# Patient Record
Sex: FEMALE | Race: WHITE | HISPANIC OR LATINO
[De-identification: names, ages, dates, MRNs, and addresses within clinical notes are randomized per-mention and may not be internally consistent; named-entity substitution may affect disease eponyms.]

---

## 2020-09-30 PROBLEM — Z00.00 ENCOUNTER FOR PREVENTIVE HEALTH EXAMINATION: Status: ACTIVE | Noted: 2020-09-30

## 2020-10-30 ENCOUNTER — APPOINTMENT (OUTPATIENT)
Dept: ENDOCRINOLOGY | Facility: CLINIC | Age: 60
End: 2020-10-30
Payer: MEDICAID

## 2020-10-30 VITALS
HEIGHT: 59 IN | OXYGEN SATURATION: 98 % | BODY MASS INDEX: 39.11 KG/M2 | HEART RATE: 98 BPM | DIASTOLIC BLOOD PRESSURE: 78 MMHG | WEIGHT: 194 LBS | SYSTOLIC BLOOD PRESSURE: 118 MMHG

## 2020-10-30 DIAGNOSIS — Z83.3 FAMILY HISTORY OF DIABETES MELLITUS: ICD-10-CM

## 2020-10-30 DIAGNOSIS — Z78.9 OTHER SPECIFIED HEALTH STATUS: ICD-10-CM

## 2020-10-30 LAB
GLUCOSE BLDC GLUCOMTR-MCNC: 125
HBA1C MFR BLD HPLC: 11.9
LDLC SERPL DIRECT ASSAY-MCNC: 143

## 2020-10-30 PROCEDURE — 99072 ADDL SUPL MATRL&STAF TM PHE: CPT

## 2020-10-30 PROCEDURE — 99205 OFFICE O/P NEW HI 60 MIN: CPT | Mod: 25

## 2020-10-30 PROCEDURE — 82962 GLUCOSE BLOOD TEST: CPT

## 2020-10-30 RX ORDER — OMEPRAZOLE 40 MG/1
40 CAPSULE, DELAYED RELEASE ORAL
Refills: 0 | Status: ACTIVE | COMMUNITY

## 2020-10-30 NOTE — PHYSICAL EXAM
[Alert] : alert [Healthy Appearance] : healthy appearance [Obese] : obese [No Acute Distress] : no acute distress [No LAD] : no lymphadenopathy [No Thyroid Nodules] : no palpable thyroid nodules [No Accessory Muscle Use] : no accessory muscle use [Clear to Auscultation] : lungs were clear to auscultation bilaterally [Normal S1, S2] : normal S1 and S2 [Normal Rate] : heart rate was normal [No Edema] : no peripheral edema [Normal Bowel Sounds] : normal bowel sounds [Not Tender] : non-tender [Soft] : abdomen soft [Normal Gait] : normal gait [Acanthosis Nigricans] : no acanthosis nigricans [Foot Ulcers] : no foot ulcers [Right Foot Was Examined] : right foot ~C was examined [Left Foot Was Examined] : left foot ~C was examined [1+] : 1+ in the dorsalis pedis [Vibration Dec.] : normal vibratory sensation at the level of the toes [Position Sense Dec.] : normal position sense at the level of the toes [Diminished Throughout Both Feet] : normal tactile sensation with monofilament testing throughout both feet [Cranial Nerves Intact] : cranial nerves 2-12 were intact [Oriented x3] : oriented to person, place, and time [Normal Affect] : the affect was normal [de-identified] : (+) Cushingoid appearance - buffalo hump, thin extremities, supraclavicaly fat pads, no abd striae, obese abdomen [de-identified] : flighty thoughts, needs frequent redirection

## 2020-10-30 NOTE — REVIEW OF SYSTEMS
[Abdominal Pain] : abdominal pain [Polyuria] : polyuria [Nocturia] : nocturia [Pain/Numbness of Digits] : pain/numbness of digits [Polydipsia] : polydipsia [Fatigue] : no fatigue [Recent Weight Gain (___ Lbs)] : recent weight gain: [unfilled] lbs [Blurred Vision] : no blurred vision [Chest Pain] : no chest pain [Shortness Of Breath] : no shortness of breath [Nausea] : no nausea [Vomiting] : no vomiting [Diarrhea] : no diarrhea [Depression] : no depression [Anxiety] : no anxiety [Cold Intolerance] : no cold intolerance [Heat Intolerance] : no heat intolerance [FreeTextEntry2] : (+) increased appetite

## 2020-10-30 NOTE — DATA REVIEWED
[FreeTextEntry1] : Labs 10/22/20\par Gluc 249, A1c 11.9\par Cr 0.80, GFR 80\par , HDL 54, Tg 189\par TSH 0.945, T4 7.8, T3 107\par B12 741, vit D 39.2\par

## 2020-10-30 NOTE — ASSESSMENT
[Long Term Vascular Complications] : long term vascular complications of diabetes [Carbohydrate Consistent Diet] : carbohydrate consistent diet [Importance of Diet and Exercise] : importance of diet and exercise to improve glycemic control, achieve weight loss and improve cardiovascular health [Self Monitoring of Blood Glucose] : self monitoring of blood glucose [Retinopathy Screening] : Patient was referred to ophthalmology for retinopathy screening [FreeTextEntry1] : 60 year old female\par 1. 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 in setting of obesity - uncontrolled due to insufficient insulin doses and chronically poor diet (fast food, sugary drinks, high carb, constantly eating)\par - Levemir 70 units BID\par - Ademelog premeals only 100-150 40 units, 151-200 45, 201-250 50, 251-300 55, 301-350 60, 351+ 65 units\par cont SMBG\par - counseled pt on dietary changes but she refused to change eating habits\par - will likely need Humulin U500 insulin given that she take >150 units insulin per day and still with poor control, will send Rx to Pharmacy and work on PA if needed, RTO 2 weeks with CDE to discuss/learn how to use this insulin\par - will need A1c ,<8% prior to bariatric surgery\par \par 2. 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 Normal TSH level\par - check 24 hours urine cortisol to exclude hypercortisolism\par - considering bariatric surgery \par \par 3. HLD - cannot take statin (by history)\par  - counseled pt on low fat diet, again pt refuses to change diet\par \par pt returned to office stating that  and she is not sure what to do bc she is going to eat.  Advised pt to take Admelog 10-15 min before meals and use the scale that we just discussed\par \par \par

## 2020-10-30 NOTE — CONSULT LETTER
[Dear  ___] : Dear  [unfilled], [Consult Letter:] : I had the pleasure of evaluating your patient, [unfilled]. [Please see my note below.] : Please see my note below. [Consult Closing:] : Thank you very much for allowing me to participate in the care of this patient.  If you have any questions, please do not hesitate to contact me. [Sincerely,] : Sincerely, [FreeTextEntry3] : Yeny Pepe, \par

## 2020-10-30 NOTE — HISTORY OF PRESENT ILLNESS
[FreeTextEntry1] : poor and confusing historian, planning on bariatric surgery 1/2020 - gastric bypass. \par \par Quality: Type 2\par Severity: severe, uncontrolled\par Duration: 2003\par Onset: brother with history DM, brother tested FS and it was 200's\par Had been following with another endocrinologist at [place] but she felt he did nothing for her.  Nonadherent with medical follow up in past.\par \par ASSOCIATED SYMPTOMS/COMPLICATIONS:\par Eye exam no DR per pt 2019\par (+) Neuropathy\par (+) HUNTER\par \par MODIFYING FACTORS: initially started oral medications and later switched to insulin. she has been has been on insulin for about 15 years, she reports chronic poor control. \par Lifestyle: diet - eats whatever she wants and drinks sugary drinks.  exercise - none. weight gain summer w insulin\par Medication history: Lantus does not work. Trulicity - self DC due to weight gain. Novolog not covered by insurance.\par Current DM meds:  Levemir 60-80units BID. Admelog premeals. takes insulin without meals\par Admelog >100 30, >150 32, >200 34, >250 36, >300 38. 350 >42, 400 45 units\par Admelog scale without meals >400 60 units, takes this overnight\par \par SMBG: testing 4x daily, -high on meter. reports FS 40 at times for which she treats with multiple candy bars.\par

## 2020-11-06 ENCOUNTER — APPOINTMENT (OUTPATIENT)
Dept: ENDOCRINOLOGY | Facility: CLINIC | Age: 60
End: 2020-11-06
Payer: MEDICAID

## 2020-11-06 PROCEDURE — 99072 ADDL SUPL MATRL&STAF TM PHE: CPT

## 2020-11-06 PROCEDURE — G0108 DIAB MANAGE TRN  PER INDIV: CPT

## 2020-11-09 RX ORDER — INSULIN HUMAN 500 [IU]/ML
500 INJECTION, SOLUTION SUBCUTANEOUS
Qty: 2 | Refills: 1 | Status: DISCONTINUED | COMMUNITY
Start: 2020-10-30 | End: 2020-11-09

## 2020-11-10 ENCOUNTER — NON-APPOINTMENT (OUTPATIENT)
Age: 60
End: 2020-11-10

## 2020-11-11 RX ORDER — INSULIN HUMAN 500 [IU]/ML
500 INJECTION, SOLUTION SUBCUTANEOUS
Qty: 135 | Refills: 1 | Status: DISCONTINUED | COMMUNITY
Start: 2020-11-09 | End: 2020-11-11

## 2020-11-12 ENCOUNTER — NON-APPOINTMENT (OUTPATIENT)
Age: 60
End: 2020-11-12

## 2020-11-12 DIAGNOSIS — K75.81 NONALCOHOLIC STEATOHEPATITIS (NASH): ICD-10-CM

## 2020-11-12 DIAGNOSIS — R68.89 OTHER GENERAL SYMPTOMS AND SIGNS: ICD-10-CM

## 2020-11-19 ENCOUNTER — NON-APPOINTMENT (OUTPATIENT)
Age: 60
End: 2020-11-19

## 2020-11-25 ENCOUNTER — NON-APPOINTMENT (OUTPATIENT)
Age: 60
End: 2020-11-25

## 2020-12-02 LAB
HBA1C MFR BLD HPLC: 12.3
LDLC SERPL DIRECT ASSAY-MCNC: 129

## 2020-12-03 ENCOUNTER — RESULT CHARGE (OUTPATIENT)
Age: 60
End: 2020-12-03

## 2020-12-03 ENCOUNTER — APPOINTMENT (OUTPATIENT)
Dept: ENDOCRINOLOGY | Facility: CLINIC | Age: 60
End: 2020-12-03
Payer: MEDICAID

## 2020-12-03 VITALS — WEIGHT: 196 LBS | BODY MASS INDEX: 39.51 KG/M2 | HEART RATE: 102 BPM | OXYGEN SATURATION: 98 % | HEIGHT: 59 IN

## 2020-12-03 VITALS — DIASTOLIC BLOOD PRESSURE: 80 MMHG | SYSTOLIC BLOOD PRESSURE: 120 MMHG

## 2020-12-03 PROCEDURE — 99215 OFFICE O/P EST HI 40 MIN: CPT | Mod: 25

## 2020-12-03 PROCEDURE — 99072 ADDL SUPL MATRL&STAF TM PHE: CPT

## 2020-12-03 PROCEDURE — 82962 GLUCOSE BLOOD TEST: CPT

## 2020-12-03 RX ORDER — SYRINGE,INSUL U-500,NDL,0.5ML 31GX15/64"
31G X 6MM SYRINGE, EMPTY DISPOSABLE MISCELLANEOUS
Qty: 3 | Refills: 0 | Status: DISCONTINUED | COMMUNITY
Start: 2020-11-09 | End: 2020-12-03

## 2020-12-03 RX ORDER — DEXAMETHASONE 1 MG/1
1 TABLET ORAL
Qty: 1 | Refills: 0 | Status: DISCONTINUED | COMMUNITY
Start: 2020-11-12 | End: 2020-12-03

## 2020-12-04 LAB — GLUCOSE BLDC GLUCOMTR-MCNC: 244

## 2020-12-04 NOTE — DATA REVIEWED
[FreeTextEntry1] : Labs 10/22/20\par Gluc 249, A1c 11.9\par Cr 0.80, GFR 80\par , HDL 54, Tg 189\par TSH 0.945, T4 7.8, T3 107\par B12 741, vit D 39.2\par \par Labs 11/4/20\par 24 hour urine cortisol 22.9 (normal)\par A1c 12.4\par Cr 0.71\par \par TSH 1.28\par \par Labs 11/16/20 1 mg DST cortisol 1.0 (appropriate)\par

## 2020-12-04 NOTE — ASSESSMENT
[FreeTextEntry1] : 60 year old female\par 1. 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 Normal TSH level, Rules out for Cushings w normal 24 hour urine cortisol and normal 1 mg DST\par - second opinion w different bariatric surgeon\par \par 3. HLD - cannot take statin (by history)\par  - counseled pt on low fat diet, again pt refuses to change diet\par \par \par f/u 1 month\par \par \par \par

## 2020-12-04 NOTE — HISTORY OF PRESENT ILLNESS
[FreeTextEntry1] : poor and confusing historian, planning on bariatric surgery 1/2020 - gastric bypass vs gastric sleeve, but needs lower A1c\par stared Humulin U500 2-3 days ago but does not feels that it is working - taking 50-60 units TID\par she fired her bariatric surgeons and will see new surgeon, Dr Daniel. \par \par Quality: Type 2\par Severity: severe, uncontrolled\par Duration: 2003\par Onset: brother with history DM, brother tested FS and it was 200's\par Had been following with another endocrinologist at [place] but she felt he did nothing for her.  Nonadherent with medical follow up in past.\par \par ASSOCIATED SYMPTOMS/COMPLICATIONS:\par Eye exam no DR per pt 2019\par (+) Neuropathy\par (+) HUNTER\par \par MODIFYING FACTORS: initially started oral medications and later switched to insulin. she has been has been on insulin for about 15 years, she reports chronic poor control. \par Lifestyle: diet - eats whatever she wants and drinks sugary drinks.  exercise - none. weight gain summer w insulin\par Medication history: Lantus does not work. Trulicity - self DC due to weight gain. Novolog not covered by insurance.\par Current DM meds:  Humalog U500 60 units TID\par \par SMBG: testing 4x daily Fs 300-500 w U500 insulin\par

## 2020-12-04 NOTE — PHYSICAL EXAM
[Alert] : alert [Healthy Appearance] : healthy appearance [Obese] : obese [No Acute Distress] : no acute distress [No LAD] : no lymphadenopathy [No Thyroid Nodules] : no palpable thyroid nodules [No Accessory Muscle Use] : no accessory muscle use [Clear to Auscultation] : lungs were clear to auscultation bilaterally [Normal S1, S2] : normal S1 and S2 [Normal Rate] : heart rate was normal [No Edema] : no peripheral edema [Normal Bowel Sounds] : normal bowel sounds [Not Tender] : non-tender [Soft] : abdomen soft [Normal Gait] : normal gait [Cranial Nerves Intact] : cranial nerves 2-12 were intact [Oriented x3] : oriented to person, place, and time [Normal Affect] : the affect was normal [Acanthosis Nigricans] : no acanthosis nigricans [Foot Ulcers] : no foot ulcers [de-identified] : (+) Cushingoid appearance - buffalo hump, thin extremities, supraclavicaly fat pads, no abd striae, obese abdomen [de-identified] : flighty thoughts, needs frequent redirection

## 2020-12-04 NOTE — REVIEW OF SYSTEMS
[Recent Weight Gain (___ Lbs)] : recent weight gain: [unfilled] lbs [Abdominal Pain] : abdominal pain [Polyuria] : polyuria [Nocturia] : nocturia [Pain/Numbness of Digits] : pain/numbness of digits [Polydipsia] : polydipsia [Fatigue] : no fatigue [Blurred Vision] : no blurred vision [Chest Pain] : no chest pain [Shortness Of Breath] : no shortness of breath [Nausea] : no nausea [Vomiting] : no vomiting [Diarrhea] : no diarrhea [Depression] : no depression [Anxiety] : no anxiety [Cold Intolerance] : no cold intolerance [Heat Intolerance] : no heat intolerance [FreeTextEntry2] : (+) increased appetite

## 2020-12-08 ENCOUNTER — NON-APPOINTMENT (OUTPATIENT)
Age: 60
End: 2020-12-08

## 2020-12-11 ENCOUNTER — NON-APPOINTMENT (OUTPATIENT)
Age: 60
End: 2020-12-11

## 2020-12-22 ENCOUNTER — NON-APPOINTMENT (OUTPATIENT)
Age: 60
End: 2020-12-22

## 2020-12-30 ENCOUNTER — NON-APPOINTMENT (OUTPATIENT)
Age: 60
End: 2020-12-30

## 2020-12-31 ENCOUNTER — APPOINTMENT (OUTPATIENT)
Dept: ENDOCRINOLOGY | Facility: CLINIC | Age: 60
End: 2020-12-31
Payer: MEDICAID

## 2020-12-31 ENCOUNTER — RESULT CHARGE (OUTPATIENT)
Age: 60
End: 2020-12-31

## 2020-12-31 VITALS
BODY MASS INDEX: 41.53 KG/M2 | DIASTOLIC BLOOD PRESSURE: 80 MMHG | HEIGHT: 59 IN | WEIGHT: 206 LBS | OXYGEN SATURATION: 95 % | TEMPERATURE: 97.8 F | SYSTOLIC BLOOD PRESSURE: 126 MMHG | HEART RATE: 105 BPM

## 2020-12-31 VITALS — HEART RATE: 90 BPM

## 2020-12-31 LAB — GLUCOSE BLDC GLUCOMTR-MCNC: 290

## 2020-12-31 PROCEDURE — 99214 OFFICE O/P EST MOD 30 MIN: CPT | Mod: 25

## 2020-12-31 PROCEDURE — 99072 ADDL SUPL MATRL&STAF TM PHE: CPT

## 2020-12-31 PROCEDURE — 82962 GLUCOSE BLOOD TEST: CPT

## 2020-12-31 NOTE — HISTORY OF PRESENT ILLNESS
[FreeTextEntry1] : poor and confusing historian, planning on bariatric surgery 1/2020 - gastric bypass vs gastric sleeve, but needs lower A1c\par she fired her bariatric surgeons and will see new surgeon, Dr Daniel. \par here for glucose log review and med adjustment\par conplains that Elaine casuing LE swelling and weight gain and was advised to stop but she didn’t b/c she is waiting to discuss w rheum\par FS todat 500's after coffee w creams, crossaint, deli sandwich and hot pocket\par feels palpitations and SOB on occasion\par \par Quality: Type 2\par Severity: severe, uncontrolled\par Duration: 2003\par Onset: brother with history DM, brother tested FS and it was 200's\par Had been following with another endocrinologist at [place] but she felt he did nothing for her.  Nonadherent with medical follow up in past.\par \par ASSOCIATED SYMPTOMS/COMPLICATIONS:\par Eye exam no DR per pt 2019\par (+) Neuropathy\par (+) HUNTER\par ?CAD\par \par MODIFYING FACTORS: initially started oral medications and later switched to insulin. she has been has been on insulin for about 15 years, she reports chronic poor control. \par Lifestyle: diet - eats whatever she wants and drinks sugary drinks.  exercise - none. weight gain summer w insulin\par Medication history: Lantus does not work. Trulicity - self DC due to weight gain. Novolog not covered by insurance.\par \par Current DM meds:  U500 85 units BID, Admelog 65 units TID\par SMBG: testing 4x-6x daily, variable Fs 100-500's\par

## 2020-12-31 NOTE — PHYSICAL EXAM
[Alert] : alert [Healthy Appearance] : healthy appearance [Obese] : obese [No Acute Distress] : no acute distress [No LAD] : no lymphadenopathy [No Thyroid Nodules] : no palpable thyroid nodules [No Accessory Muscle Use] : no accessory muscle use [Clear to Auscultation] : lungs were clear to auscultation bilaterally [Normal S1, S2] : normal S1 and S2 [Normal Rate] : heart rate was normal [No Edema] : no peripheral edema [Normal Bowel Sounds] : normal bowel sounds [Not Tender] : non-tender [Soft] : abdomen soft [Normal Gait] : normal gait [Cranial Nerves Intact] : cranial nerves 2-12 were intact [Oriented x3] : oriented to person, place, and time [Normal Affect] : the affect was normal [Acanthosis Nigricans] : no acanthosis nigricans [Foot Ulcers] : no foot ulcers [de-identified] : (+) Cushingoid appearance - buffalo hump, thin extremities, supraclavicaly fat pads, no abd striae, obese abdomen [de-identified] : flighty thoughts, needs frequent redirection

## 2020-12-31 NOTE — ASSESSMENT
[FreeTextEntry1] : 60 year old female\par 1. 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 Normal TSH level, Rules out for Cushings w normal 24 hour urine cortisol and normal 1 mg DST. f/u Dr Daniel re bariatric surgery\par \par 3. HLD - cannot take statin (by history)\par  - counseled pt on low fat diet, again pt refuses to change diet\par \par 4. Occ palpitations w SOB - advised ER eval if symptoms recur\par \par f/u 4 weeks\par \par \par \par \par

## 2021-01-04 ENCOUNTER — APPOINTMENT (OUTPATIENT)
Dept: PULMONOLOGY | Facility: CLINIC | Age: 61
End: 2021-01-04
Payer: MEDICAID

## 2021-01-04 VITALS
HEART RATE: 111 BPM | SYSTOLIC BLOOD PRESSURE: 118 MMHG | DIASTOLIC BLOOD PRESSURE: 70 MMHG | OXYGEN SATURATION: 95 % | TEMPERATURE: 98.2 F

## 2021-01-04 PROCEDURE — 94729 DIFFUSING CAPACITY: CPT

## 2021-01-04 PROCEDURE — 99204 OFFICE O/P NEW MOD 45 MIN: CPT | Mod: 25

## 2021-01-04 PROCEDURE — 99072 ADDL SUPL MATRL&STAF TM PHE: CPT

## 2021-01-04 PROCEDURE — 94727 GAS DIL/WSHOT DETER LNG VOL: CPT

## 2021-01-04 PROCEDURE — 94060 EVALUATION OF WHEEZING: CPT

## 2021-01-05 NOTE — REASON FOR VISIT
[Initial] : an initial visit [TextBox_44] : pt. presents for pulmonary clearance for gastric sleeve surgery

## 2021-01-05 NOTE — DISCUSSION/SUMMARY
[FreeTextEntry1] : Patient denies all pulmonary symptoms /history of any disease\par will ask PMD Linette for prior home sleep study ( pt states insurance refuses to pay for in lab sleep study)\par I sleep study normal or mild sleep apnea than no cpap needed preoperatively\par await that information\par NB i received the sleep study from Linette\par pt with AHI18 ie moderated sleep apnea and I feels this requires treatment  and pt must use at least 14 days prior to surgery and anesthesia.\par I had a long discussion with patient  and she agrees to try apap\par nasal pillows humidifier\par no alcohol with in 4 hr sleep\par do not drive if tired\par will ask pt to return in 3 weeks for fu evaluation

## 2021-01-05 NOTE — HISTORY OF PRESENT ILLNESS
[TextBox_4] : 60 female no hx tobacco\par Pt for bariatric surgery Dr Daniel\par NO hx asthma no copd no emphysema\par No hx obstructive sleep apnea \par no hx shortness of breath no cough no wheeze no chest pain no tightness\par no snoring no witness apnea\par had sleep study with Linette and told mild apnea and requested sleep lab evalaution and insurance refused\par no dyspnea on exertion

## 2021-01-08 ENCOUNTER — NON-APPOINTMENT (OUTPATIENT)
Age: 61
End: 2021-01-08

## 2021-01-19 RX ORDER — BLOOD-GLUCOSE METER
W/DEVICE KIT MISCELLANEOUS
Qty: 1 | Refills: 0 | Status: ACTIVE | COMMUNITY
Start: 2020-12-23 | End: 1900-01-01

## 2021-01-28 ENCOUNTER — APPOINTMENT (OUTPATIENT)
Dept: ENDOCRINOLOGY | Facility: CLINIC | Age: 61
End: 2021-01-28

## 2021-01-29 LAB — HBA1C MFR BLD HPLC: 10.5

## 2021-01-30 ENCOUNTER — APPOINTMENT (OUTPATIENT)
Dept: ENDOCRINOLOGY | Facility: CLINIC | Age: 61
End: 2021-01-30
Payer: MEDICAID

## 2021-01-30 PROCEDURE — 99443: CPT

## 2021-01-30 RX ORDER — SEMAGLUTIDE 1.34 MG/ML
2 INJECTION, SOLUTION SUBCUTANEOUS
Qty: 3 | Refills: 1 | Status: DISCONTINUED | COMMUNITY
Start: 2021-01-18 | End: 2021-01-30

## 2021-01-30 NOTE — REASON FOR VISIT
[Home] : at home, [unfilled] , at the time of the visit. [Medical Office: ([facility])___] : at the medical office located in  [Verbal consent obtained from patient] : the patient, [unfilled] [Follow - Up] : a follow-up visit [DM Type 2] : DM Type 2

## 2021-01-30 NOTE — ASSESSMENT
[FreeTextEntry1] : 60 year old female\par 1. 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 Normal TSH level, Rules out for Cushings w normal 24 hour urine cortisol and normal 1 mg DST. f/u Dr Daniel re bariatric surgery\par \par 3. HLD - cannot take statin (by history)\par  - counseled pt on low fat diet, again pt refuses to change diet\par \par f/u 4 weeks\par \par \par \par \par

## 2021-01-30 NOTE — DATA REVIEWED
[FreeTextEntry1] : Labs 10/22/20\par Gluc 249, A1c 11.9\par Cr 0.80, GFR 80\par , HDL 54, Tg 189\par TSH 0.945, T4 7.8, T3 107\par B12 741, vit D 39.2\par \par Labs 11/4/20\par 24 hour urine cortisol 22.9 (normal)\par A1c 12.4\par Cr 0.71\par \par TSH 1.28\par \par Labs 11/16/20 1 mg DST cortisol 1.0 (appropriate)\par \par Labs 1/19/21 A1c 10.5\par

## 2021-01-30 NOTE — HISTORY OF PRESENT ILLNESS
[FreeTextEntry1] : poor and confusing historian\par needs endo clearance for bariatric surgery - weight is affecting her quality of life, stomach is so large that she cannot do her daily living activities\par has LE swelling from Lyrica\par has been seeing Dr Daniel for bariatric surgery evaluation\par trying to eat regular meals and stopped eating at night/overnight and very active around the house, smaller portions, pt FS improved - no longer seeing 400-500s. \par per pt FS: still using old scale from prior endocrinology\par  151 (90 U500, 46 admelog) - 106 (admelog 70) - 208 - 184 (u500 90 units, a70 units) - 160 (a15 units) - 135. 3 am 86\par  100 (90, 70) - 250 - 200 (a70, no lunch and then went out) and FS 40 and ate candy/soda --> 305 (90,70) - 200 - 200\par  124 (90,30) - 139 - 139 (a46) - 123 - 119 - 115\par  138 -200 - 182- 132 - 145 - 134 (90, 40)  3 am 66 (soda/candy) --> 93\par  138 (no carbs) - 139 - 95 - 59 (before lunch, no insulin) - 138 - 116 (a43) - 143 - (a45)173 - 130\par  124 - 106 - 108 - 200 - 162 - 211 - 111\par  135 - 125 - 125 - 162 - 145 (no insulin) - 200 (90,45)  3 am 130\par  136 - 107 - 107 - 150 - 144 - 185 - 111\par  117 - 115 - 106- 176 - 145 - 200 (90,60) - 135 - 100\par  130\par \par Quality: Type 2\par Severity: severe, uncontrolled\par Duration: \par Onset: brother with history DM, brother tested FS and it was 200's\par Notes: Had been following with another endocrinologist at [place] but she felt he did nothing for her.  Nonadherent with medical follow up in past.\par \par ASSOCIATED SYMPTOMS/COMPLICATIONS:\par Eye exam no DR per pt 2019\par (+) Neuropathy\par (+) HUNTER\par ?CAD\par \par MODIFYING FACTORS: initially started oral medications and later switched to insulin. she has been has been on insulin for about 15 years, she reports chronic poor control. recent improved control w adherence w diet and insulin. \par Lifestyle: diet - eats whatever she wants and drinks sugary drinks.  exercise - none. weight gain summer w insulin\par Medication history: Lantus does not work. Trulicity - self DC due to weight gain. Novolog not covered by insurance.\par \par Current DM meds:  U500 90 units BID, Admelog TID w meals, using scale from prior endo\par SMB-8x daily, see above numbers\par

## 2021-01-30 NOTE — REVIEW OF SYSTEMS
[Recent Weight Gain (___ Lbs)] : recent weight gain: [unfilled] lbs [Shortness Of Breath] : shortness of breath [SOB on Exertion] : shortness of breath on exertion [Abdominal Pain] : abdominal pain [Pain/Numbness of Digits] : pain/numbness of digits [Fatigue] : no fatigue [Blurred Vision] : no blurred vision [Chest Pain] : no chest pain [Nausea] : no nausea [Vomiting] : no vomiting [Diarrhea] : no diarrhea [Polyuria] : no polyuria [Nocturia] : no nocturia [Depression] : no depression [Anxiety] : no anxiety [Polydipsia] : no polydipsia [Cold Intolerance] : no cold intolerance [Heat Intolerance] : no heat intolerance [FreeTextEntry2] : (+) increased appetite

## 2021-02-09 ENCOUNTER — APPOINTMENT (OUTPATIENT)
Dept: PULMONOLOGY | Facility: CLINIC | Age: 61
End: 2021-02-09

## 2021-02-12 ENCOUNTER — NON-APPOINTMENT (OUTPATIENT)
Age: 61
End: 2021-02-12

## 2021-02-22 ENCOUNTER — APPOINTMENT (OUTPATIENT)
Dept: PULMONOLOGY | Facility: CLINIC | Age: 61
End: 2021-02-22
Payer: MEDICAID

## 2021-02-22 ENCOUNTER — NON-APPOINTMENT (OUTPATIENT)
Age: 61
End: 2021-02-22

## 2021-02-22 VITALS
HEART RATE: 98 BPM | SYSTOLIC BLOOD PRESSURE: 144 MMHG | TEMPERATURE: 98.6 F | OXYGEN SATURATION: 95 % | DIASTOLIC BLOOD PRESSURE: 84 MMHG

## 2021-02-22 DIAGNOSIS — G47.33 OBSTRUCTIVE SLEEP APNEA (ADULT) (PEDIATRIC): ICD-10-CM

## 2021-02-22 DIAGNOSIS — Z01.811 ENCOUNTER FOR PREPROCEDURAL RESPIRATORY EXAMINATION: ICD-10-CM

## 2021-02-22 PROCEDURE — 99214 OFFICE O/P EST MOD 30 MIN: CPT

## 2021-02-22 PROCEDURE — 99072 ADDL SUPL MATRL&STAF TM PHE: CPT

## 2021-02-22 RX ORDER — FLUTICASONE PROPIONATE AND SALMETEROL 500; 50 UG/1; UG/1
500-50 POWDER RESPIRATORY (INHALATION)
Qty: 180 | Refills: 0 | Status: ACTIVE | COMMUNITY
Start: 2020-12-11

## 2021-02-22 RX ORDER — ALBUTEROL SULFATE 90 UG/1
108 (90 BASE) INHALANT RESPIRATORY (INHALATION)
Qty: 18 | Refills: 0 | Status: ACTIVE | COMMUNITY
Start: 2020-12-11

## 2021-02-23 NOTE — HISTORY OF PRESENT ILLNESS
[Never] : never [TextBox_4] : 61 year old female for preoperative pulmonary evaluation for bariatric surgery\par pt tried to use apap 4-16 co unable to wear mask \par Pt with hx moderated obstructive sleep apnea with ahi 18\par compliance report when used machine ahi <10\par pt very eager to undergo bariatric surgery

## 2021-02-23 NOTE — REASON FOR VISIT
[Follow-Up] : a follow-up visit [TextBox_44] : PT IS A 60 YO FEMALE IN OFFICE TO EVALUATE FOR BARIATRIC SX.  PT HAD SLEEP STUDY AND HAS USED HER CPAP FOR ALMOARY A MONTH.  PT STATE THAT IT IS HARD TO BREATH WITH CPAP - GIVES HER STUFFY/RUNNY NOSE, BUT WAKES UP WITH DRY , BLEEDING NOSE.

## 2021-02-23 NOTE — DISCUSSION/SUMMARY
[FreeTextEntry1] : Pt with moderate sleep apnea and unable to use apap and insurance will not pay for in lab titration\par pts with moderated obstructive sleep apnea have increase resp complications after major surgery and anesthesia if they do NOT use cpap  for 14 d preoperative\par I will discuss with Dr Daniel  regarding further treatments\par discussed with pt  and I will inform her of our decision\par NB I discussed with Dr GABBY Daniel regarding moderate obstructive sleep apnea and inability to use apap  and the increase postoperative resp risk\par Dr Daniel agrees with the increase risk BUT feels pt will be able to undergo surgery and he is present with anesthesia to monitor airway at all times\par discussed with pt and she understands risk and agrees to proceed

## 2021-02-24 ENCOUNTER — RESULT CHARGE (OUTPATIENT)
Age: 61
End: 2021-02-24

## 2021-02-25 ENCOUNTER — APPOINTMENT (OUTPATIENT)
Dept: ENDOCRINOLOGY | Facility: CLINIC | Age: 61
End: 2021-02-25
Payer: MEDICAID

## 2021-02-25 VITALS
BODY MASS INDEX: 42.74 KG/M2 | DIASTOLIC BLOOD PRESSURE: 80 MMHG | OXYGEN SATURATION: 99 % | SYSTOLIC BLOOD PRESSURE: 120 MMHG | WEIGHT: 212 LBS | HEART RATE: 95 BPM | HEIGHT: 59 IN

## 2021-02-25 DIAGNOSIS — E16.2 HYPOGLYCEMIA, UNSPECIFIED: ICD-10-CM

## 2021-02-25 LAB
GLUCOSE BLDC GLUCOMTR-MCNC: 147
GLUCOSE BLDC GLUCOMTR-MCNC: 70

## 2021-02-25 PROCEDURE — 82962 GLUCOSE BLOOD TEST: CPT

## 2021-02-25 PROCEDURE — 99215 OFFICE O/P EST HI 40 MIN: CPT | Mod: 25

## 2021-02-25 PROCEDURE — 99072 ADDL SUPL MATRL&STAF TM PHE: CPT

## 2021-02-25 RX ORDER — HYDROXYZINE PAMOATE 25 MG/1
25 CAPSULE ORAL
Refills: 0 | Status: DISCONTINUED | COMMUNITY
End: 2021-02-25

## 2021-02-25 RX ORDER — PREGABALIN 300 MG/1
CAPSULE ORAL
Refills: 0 | Status: DISCONTINUED | COMMUNITY
End: 2021-02-25

## 2021-02-25 RX ORDER — PREGABALIN 150 MG/1
150 CAPSULE ORAL
Qty: 60 | Refills: 0 | Status: DISCONTINUED | COMMUNITY
Start: 2020-12-14 | End: 2021-02-25

## 2021-02-25 RX ORDER — INSULIN DETEMIR 100 [IU]/ML
100 INJECTION, SOLUTION SUBCUTANEOUS
Refills: 0 | Status: DISCONTINUED | COMMUNITY
End: 2021-02-25

## 2021-02-25 RX ORDER — FLUOROMETHOLONE 1 MG/ML
0.1 SOLUTION/ DROPS OPHTHALMIC
Qty: 5 | Refills: 0 | Status: DISCONTINUED | COMMUNITY
Start: 2020-11-20 | End: 2021-02-25

## 2021-02-25 RX ORDER — AMITRIPTYLINE HYDROCHLORIDE 10 MG/1
10 TABLET, FILM COATED ORAL
Qty: 30 | Refills: 0 | Status: DISCONTINUED | COMMUNITY
Start: 2021-02-02 | End: 2021-02-25

## 2021-02-25 NOTE — PHYSICAL EXAM
[Alert] : alert [Well Nourished] : well nourished [Obese] : obese [No Acute Distress] : no acute distress [EOMI] : extra ocular movement intact [No Accessory Muscle Use] : no accessory muscle use [Clear to Auscultation] : lungs were clear to auscultation bilaterally [Normal S1, S2] : normal S1 and S2 [Normal Rate] : heart rate was normal [Not Tender] : non-tender [Soft] : abdomen soft [Oriented x3] : oriented to person, place, and time [Normal Affect] : the affect was normal [Normal Insight/Judgement] : insight and judgment were intact [Normal Mood] : the mood was normal [de-identified] : (+) bilateral LE edema

## 2021-02-25 NOTE — HISTORY OF PRESENT ILLNESS
[FreeTextEntry1] : confusing historian -having trouble obtaining U500 from pharmacy stating that insurance not paying for higher dose of U500 insulin\par \par needs bariatric surgery - weight is affecting her quality of life, stomach is so large that she cannot do her daily living activities\par has been seeing Dr Daniel for bariatric surgery evaluation and undergoing necessary evaluation for this procedure\par \par trying to eat regular meals and stopped eating at night/overnight and very active around the house, smaller portions, pt FS improved\par SMB-8x daily, logs reviewed\par  120/85 - 57/144 - 144-111 - 95\par  123/136 - 138/118 - 115/135 - 88\par  139/115 - 170/130 - 95/120 - 118\par  190/------- 190/120 - 100/140 - 100\par 2/15 93/125 - 115/128 - 120-166 - 110\par  70/172 - 200/98 - 111/102 - 95\par  80/94 - 103/133 - 110/136 - 90\par pt reports high sugars due to coffee over overtreating lows with sweets \par FS 70 today at visit, reports hypoglycemia FS 50 w sx 2x/week, worse with increaed physical activity\par has also been taking insulin without eating\par \par Quality: Type 2\par Severity: severe, uncontrolled\par Duration: \par Onset: brother with history DM, brother tested FS and it was 200's\par Notes: Had been following with another endocrinologist at [place] but she felt he did nothing for her.  Nonadherent with medical follow up in past.\par \par ASSOCIATED SYMPTOMS/COMPLICATIONS:\par Eye exam no DR per pt 2019\par (+) Neuropathy\par (+) HUNTER\par ?CAD\par \par MODIFYING FACTORS: initially started oral medications and later switched to insulin. she has been has been on insulin for about 15 years, she reports chronic poor control. recent improved control w adherence w diet and insulin. \par \par Medication history: Lantus does not work. Trulicity - self DC due to weight gain. Novolog not covered by insurance.\par \par Current DM meds:  U500 90 units breakfast and bedtime, Admelog 70 units before meals, sometimes take 40 units if FA <150\par \par

## 2021-02-25 NOTE — DATA REVIEWED
[FreeTextEntry1] : Labs 10/22/20\par Gluc 249, A1c 11.9\par Cr 0.80, GFR 80\par , HDL 54, Tg 189\par TSH 0.945, T4 7.8, T3 107\par B12 741, vit D 39.2\par \par Labs 11/4/20\par 24 hour urine cortisol 22.9 (normal)\par A1c 12.4\par Cr 0.71\par \par TSH 1.28\par \par Labs 11/16/20 1 mg DST cortisol 1.0 (appropriate)\par \par Labs 1/19/21 A1c 10.5, B12 135\par

## 2021-02-25 NOTE — ASSESSMENT
[FreeTextEntry1] : 60 year old female\par 1. 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 in setting of obesity - improved glucoses with changes in eating habits and adherence with insulin, A1c 10.5 - which is improved, now with bouts of hypoglycemia likely due to improved diet and resolving glucose toxicity\par - decrease Humulin U500 80 units BID before meals, cont Admelog before meals but at decreased dose use the following scale:  40 units, 151-200 45 units, 201-251 50 units, 251-300 60 units, 301+ 70 units\par - cont SMBG 8x daily\par - recent logs are very well controlled and pt doing the best she can - okay for bariatric surgery, clearance letter fazed\par - pt to call office when she has date of surgery and will reduce Admelog and stop U500 insulin while she is on bariatric diet 2 weeks prior to surgery\par - pt w hypoglycemia at visit, given juice and crackers and remained in office until glucoses improved\par - counseled pt that she must eat meal after taking insulin \par \par 2. 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 Normal TSH level, Rules out for Cushings w normal 24 hour urine cortisol and normal 1 mg DST. f/u Dr Daniel re bariatric surgery\par \par 3. HLD - cannot take statin (by history)\par  - counseled pt on low fat diet, again pt refuses to change diet\par \par updated labs needed, see below\par \par \par \par \par

## 2021-03-03 ENCOUNTER — NON-APPOINTMENT (OUTPATIENT)
Age: 61
End: 2021-03-03

## 2021-03-12 ENCOUNTER — NON-APPOINTMENT (OUTPATIENT)
Age: 61
End: 2021-03-12

## 2021-04-09 ENCOUNTER — NON-APPOINTMENT (OUTPATIENT)
Age: 61
End: 2021-04-09

## 2021-07-08 ENCOUNTER — NON-APPOINTMENT (OUTPATIENT)
Age: 61
End: 2021-07-08

## 2021-07-22 LAB
HBA1C MFR BLD HPLC: 8.5
LDLC SERPL DIRECT ASSAY-MCNC: 122
MICROALBUMIN/CREAT 24H UR-RTO: 181

## 2021-07-23 ENCOUNTER — APPOINTMENT (OUTPATIENT)
Dept: ENDOCRINOLOGY | Facility: CLINIC | Age: 61
End: 2021-07-23
Payer: MEDICAID

## 2021-07-23 ENCOUNTER — RESULT CHARGE (OUTPATIENT)
Age: 61
End: 2021-07-23

## 2021-07-23 VITALS
TEMPERATURE: 97.2 F | SYSTOLIC BLOOD PRESSURE: 120 MMHG | OXYGEN SATURATION: 97 % | WEIGHT: 171 LBS | DIASTOLIC BLOOD PRESSURE: 72 MMHG | HEIGHT: 59 IN | BODY MASS INDEX: 34.47 KG/M2 | HEART RATE: 95 BPM

## 2021-07-23 DIAGNOSIS — Z87.898 PERSONAL HISTORY OF OTHER SPECIFIED CONDITIONS: ICD-10-CM

## 2021-07-23 LAB — GLUCOSE BLDC GLUCOMTR-MCNC: 255

## 2021-07-23 PROCEDURE — 99072 ADDL SUPL MATRL&STAF TM PHE: CPT

## 2021-07-23 PROCEDURE — 82962 GLUCOSE BLOOD TEST: CPT

## 2021-07-23 PROCEDURE — 99215 OFFICE O/P EST HI 40 MIN: CPT | Mod: 25

## 2021-07-23 RX ORDER — PREGABALIN 225 MG/1
225 CAPSULE ORAL
Qty: 60 | Refills: 0 | Status: DISCONTINUED | COMMUNITY
Start: 2021-02-02 | End: 2021-07-23

## 2021-07-23 RX ORDER — PEN NEEDLE, DIABETIC 32GX 5/32"
32G X 4 MM NEEDLE, DISPOSABLE MISCELLANEOUS
Qty: 4 | Refills: 1 | Status: DISCONTINUED | COMMUNITY
Start: 2020-11-11 | End: 2021-07-23

## 2021-07-23 RX ORDER — HYDROXYZINE HYDROCHLORIDE 25 MG/1
25 TABLET ORAL
Qty: 60 | Refills: 0 | Status: DISCONTINUED | COMMUNITY
Start: 2020-10-26 | End: 2021-07-23

## 2021-07-23 RX ORDER — KETOTIFEN FUMARATE 0.25 MG/ML
0.03 SOLUTION/ DROPS OPHTHALMIC
Qty: 5 | Refills: 0 | Status: DISCONTINUED | COMMUNITY
Start: 2020-11-20 | End: 2021-07-23

## 2021-07-23 RX ORDER — MUPIROCIN 20 MG/G
2 OINTMENT TOPICAL
Qty: 22 | Refills: 0 | Status: DISCONTINUED | COMMUNITY
Start: 2021-02-09 | End: 2021-07-23

## 2021-07-23 RX ORDER — INSULIN HUMAN 500 [IU]/ML
500 INJECTION, SOLUTION SUBCUTANEOUS
Qty: 3 | Refills: 0 | Status: DISCONTINUED | COMMUNITY
Start: 2020-11-11 | End: 2021-07-23

## 2021-07-23 NOTE — REVIEW OF SYSTEMS
[Pain/Numbness of Digits] : pain/numbness of digits [Recent Weight Loss (___ Lbs)] : recent weight loss: [unfilled] lbs [Fatigue] : no fatigue [Blurred Vision] : no blurred vision [Chest Pain] : no chest pain [Shortness Of Breath] : no shortness of breath [SOB on Exertion] : no shortness of breath on exertion [Nausea] : no nausea [Abdominal Pain] : no abdominal pain [Vomiting] : no vomiting [Diarrhea] : no diarrhea [Polyuria] : no polyuria [Nocturia] : no nocturia [Depression] : no depression [Anxiety] : no anxiety [Cold Intolerance] : no cold intolerance [Polydipsia] : no polydipsia [Heat Intolerance] : no heat intolerance [FreeTextEntry2] : (+) increased appetite

## 2021-07-23 NOTE — DATA REVIEWED
[FreeTextEntry1] : Labs 10/22/20\par Gluc 249, A1c 11.9\par Cr 0.80, GFR 80\par , HDL 54, Tg 189\par TSH 0.945, T4 7.8, T3 107\par B12 741, vit D 39.2\par \par Labs 11/4/20\par 24 hour urine cortisol 22.9 (normal)\par A1c 12.4\par Cr 0.71\par \par TSH 1.28\par \par Labs 11/16/20 1 mg DST cortisol 1.0 (appropriate)\par \par Labs 1/19/21 A1c 10.5, B12 135\par \par Labs 3/1/21 \par Gluc 137, A1c 8.5\par Cr 0.79, GFR 81\par , Tg 161, HDL 49\par TSH 1.23\par urine alb/Cr 181\par

## 2021-07-23 NOTE — PHYSICAL EXAM
[Alert] : alert [Well Nourished] : well nourished [Obese] : obese [No Acute Distress] : no acute distress [EOMI] : extra ocular movement intact [No Accessory Muscle Use] : no accessory muscle use [Clear to Auscultation] : lungs were clear to auscultation bilaterally [Normal S1, S2] : normal S1 and S2 [Normal Rate] : heart rate was normal [Not Tender] : non-tender [Soft] : abdomen soft [Oriented x3] : oriented to person, place, and time [Normal Affect] : the affect was normal [Normal Insight/Judgement] : insight and judgment were intact [Normal Mood] : the mood was normal [No Edema] : no peripheral edema [Normal Gait] : normal gait [Cranial Nerves Intact] : cranial nerves 2-12 were intact

## 2021-07-23 NOTE — ASSESSMENT
[FreeTextEntry1] : 61 year old female\par 1. 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  Pt also has profound and dangerous hypoglycemia due to insulin without meals.  Some hyperglycemia related to eating candy when sugar is normal\par - needs to adhere with bariatric surgery diet\par - decrease Admelog 0-200 no insulin, no snacks either\par 201-250 40 units, 251-300 45 units, 301-350 50 units, 351-400 55 units, 401+ 60 units\par - start Victoza 0.6 mg daily to help w appetite suppression, help with glucose lowering so she can be on lower insulin doses. risks/benefits discussed\par - will get labs from [facility]\par - f/u 6 weeks NP\par - pt refused teaching apt for Victoza\par \par 2. Obesity - weight loss s/p gastric sleeve, encouraged adherence with diet, advised that she follow bariatric nutrion recs\par \par 3. HLD - cannot take statin (by history)\par  - labs from [facility]\par \par \par \par \par \par

## 2021-07-23 NOTE — HISTORY OF PRESENT ILLNESS
[FreeTextEntry1] : Interval Hx: \par s/p gastric sleeve 4/14/21, [facility] with post op anemia s/p transfusion and lost 50 pounds\par admits to snacking, eating 5-6 meals daily - protein, veggies and fruits, hunger is coming back and now always hungry, but stopped overnight eating\par has been eating candy when FS <200 \par has been giving admelog to correct for high sugars even when not eating\par overtreats lows with candy resulting in hyperglycemia\par \par Meter reviewed\par 7/17 277 169 213 220 39\par 7/18 100 258 314 245 83\par 7/19 210 40 369 165 \par 7/20 47 98 306 377 352 216 \par 7/21 29 167 268 180\par 7/22 62 108 184 262 200 226 96\par 7/23 167 68 89 243 297\par \par \par Quality: Type 2\par Severity: severe, uncontrolled\par Duration: 2003\par Onset: brother with history DM, brother tested FS and it was 200's\par \par ASSOCIATED SYMPTOMS/COMPLICATIONS:\par Eye exam no DR per pt 2019\par (+) Neuropathy\par (+) HUNTER\par ?CAD\par \par \par Medication history: Lantus does not work. Trulicity - self DC due to weight gain. Novolog not covered by insurance. Humulin u500 prior to bariatric surgery\par \par Current DM meds:  \par Admelog 100-150 40, 151-200 45, 201-250 50, 251-300 55, 301-350 60, 351+ 64\par \par

## 2021-08-25 LAB
HBA1C MFR BLD HPLC: 8.6
LDLC SERPL DIRECT ASSAY-MCNC: 135

## 2021-08-26 ENCOUNTER — APPOINTMENT (OUTPATIENT)
Dept: ENDOCRINOLOGY | Facility: CLINIC | Age: 61
End: 2021-08-26

## 2021-09-20 ENCOUNTER — RX RENEWAL (OUTPATIENT)
Age: 61
End: 2021-09-20

## 2021-10-07 ENCOUNTER — APPOINTMENT (OUTPATIENT)
Dept: ENDOCRINOLOGY | Facility: CLINIC | Age: 61
End: 2021-10-07
Payer: MEDICAID

## 2021-10-07 ENCOUNTER — RESULT CHARGE (OUTPATIENT)
Age: 61
End: 2021-10-07

## 2021-10-07 VITALS
BODY MASS INDEX: 32.46 KG/M2 | HEIGHT: 59 IN | OXYGEN SATURATION: 97 % | DIASTOLIC BLOOD PRESSURE: 74 MMHG | HEART RATE: 97 BPM | SYSTOLIC BLOOD PRESSURE: 116 MMHG | WEIGHT: 161 LBS

## 2021-10-07 DIAGNOSIS — M10.9 GOUT, UNSPECIFIED: ICD-10-CM

## 2021-10-07 LAB
GLUCOSE BLDC GLUCOMTR-MCNC: 153
GLUCOSE BLDC GLUCOMTR-MCNC: 184

## 2021-10-07 PROCEDURE — 99215 OFFICE O/P EST HI 40 MIN: CPT | Mod: 25

## 2021-10-07 PROCEDURE — 82962 GLUCOSE BLOOD TEST: CPT

## 2021-10-07 RX ORDER — COLCHICINE 0.6 MG/1
TABLET ORAL
Refills: 0 | Status: ACTIVE | COMMUNITY

## 2021-10-07 NOTE — DATA REVIEWED
[FreeTextEntry1] : Labs 10/22/20\par Gluc 249, A1c 11.9\par Cr 0.80, GFR 80\par , HDL 54, Tg 189\par TSH 0.945, T4 7.8, T3 107\par B12 741, vit D 39.2\par \par Labs 11/4/20\par 24 hour urine cortisol 22.9 (normal)\par A1c 12.4\par Cr 0.71\par \par TSH 1.28\par \par Labs 11/16/20 1 mg DST cortisol 1.0 (appropriate)\par \par Labs 1/19/21 A1c 10.5, B12 135\par \par Labs 3/1/21 \par Gluc 137, A1c 8.5\par Cr 0.79, GFR 81\par , Tg 161, HDL 49\par TSH 1.23\par urine alb/Cr 181\par \par Labs 9/13/21\par Cr 0.9\par B12 684\par A1c 7.8\par vit D 45\par , Tg 239\par

## 2021-10-07 NOTE — PHYSICAL EXAM
[Alert] : alert [Well Nourished] : well nourished [Obese] : obese [No Acute Distress] : no acute distress [EOMI] : extra ocular movement intact [No Accessory Muscle Use] : no accessory muscle use [Clear to Auscultation] : lungs were clear to auscultation bilaterally [Normal S1, S2] : normal S1 and S2 [Normal Rate] : heart rate was normal [No Edema] : no peripheral edema [Not Tender] : non-tender [Soft] : abdomen soft [Normal Gait] : normal gait [Cranial Nerves Intact] : cranial nerves 2-12 were intact [Oriented x3] : oriented to person, place, and time [Normal Affect] : the affect was normal [Normal Insight/Judgement] : insight and judgment were intact [Normal Mood] : the mood was normal [2+] : 2+ in the dorsalis pedis [Foot Ulcers] : no foot ulcers [Vibration Dec.] : normal vibratory sensation at the level of the toes [Diminished Throughout Both Feet] : normal tactile sensation with monofilament testing throughout both feet

## 2021-10-07 NOTE — HISTORY OF PRESENT ILLNESS
[FreeTextEntry1] : Interval Hx: \par - s/p gastric sleeve 4/14/21, [facility] with post op anemia s/p transfusion and lost 55 pounds and has been following with bariatric surgeon\par - has HUNTER/?liver cirhhosis (bariatric surgeon noticed this at time of surgery)- to see GI doctor\par - has been adherent with Victoza 0.6 mg daily and would like dose increase; reports some diarrhea with it\par - using admelog scale 201-250 40 units, 251-300 45 units, 301-350 50 units, 351-400 55 units, 401+ 60 units\par - reports  A1c in the 7's\par - stopped candy, decreased appetite on Victoza\par - following bariatric diet - small meals and protein snacks\par - has been giving admelog to correct for high sugars even when not eating\par - worsening gout due to high protein diet and now cut back \par \par Meter reviewed\par [phone]\par [phone]\par 238-173\par [phone]\par [phone]\par 235-360\par [phone]\par \par Quality: Type 2\par Severity: severe, uncontrolled\par Duration: 2003\par Onset: brother with history DM, brother tested FS and it was 200's\par \par ASSOCIATED SYMPTOMS/COMPLICATIONS:\par Eye exam no DR per pt 2019\par (+) Neuropathy\par (+) HUNTER\par denies CAD\par \par Medication history: Lantus does not work. Trulicity - self DC due to weight gain. Novolog not covered by insurance. Humulin u500 prior to bariatric surgery\par \par \par \par

## 2021-10-07 NOTE — ASSESSMENT
[FreeTextEntry1] : 61 year old female\par 1. 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  Improved A1c!! down to 7.8% - but still taking Admelog to correct for high sugars in btw meals, Hypoglycemia due to pt misuse of admelog\par - needs to adhere with bariatric surgery diet\par - avoid over treating lows\par - cont Admelog U-200\par 201-250 40 units, 251-300 45 units, 301-350 50 units, 351-400 55 units, 401+ 60 units\par - increase Victoza 1.2 mg daily to help w appetite suppression, help with glucose lowering so she can be on lower insulin doses. risks/benefits discussed\par \par 2. Obesity - weight loss s/p gastric sleeve, encouraged adherence with diet, advised that she follow bariatric nutrion recs\par \par 3. HLD - cannot take statin (by history)\par \par 4. HUNTER/fatty \par  - discussed pioglitazine risks/benefits but she declines, will follow up with GI doctor\par \par pt was feeling like she was having low sugar - repeat 's, reassurance given\par \par \par \par \par \par

## 2021-10-07 NOTE — REVIEW OF SYSTEMS
[Recent Weight Loss (___ Lbs)] : recent weight loss: [unfilled] lbs [Pain/Numbness of Digits] : pain/numbness of digits [Fatigue] : no fatigue [Blurred Vision] : no blurred vision [Chest Pain] : no chest pain [Shortness Of Breath] : no shortness of breath [SOB on Exertion] : no shortness of breath on exertion [Nausea] : no nausea [Abdominal Pain] : no abdominal pain [Vomiting] : no vomiting [Diarrhea] : no diarrhea [Polyuria] : no polyuria [Nocturia] : no nocturia [Depression] : no depression [Anxiety] : no anxiety [Polydipsia] : no polydipsia [Cold Intolerance] : no cold intolerance [Heat Intolerance] : no heat intolerance

## 2021-10-11 ENCOUNTER — RX CHANGE (OUTPATIENT)
Age: 61
End: 2021-10-11

## 2021-11-18 ENCOUNTER — RX RENEWAL (OUTPATIENT)
Age: 61
End: 2021-11-18

## 2021-11-18 RX ORDER — PEN NEEDLE, DIABETIC 29 G X1/2"
31G X 5 MM NEEDLE, DISPOSABLE MISCELLANEOUS
Qty: 4 | Refills: 3 | Status: ACTIVE | COMMUNITY
Start: 2021-07-23 | End: 1900-01-01

## 2021-11-22 ENCOUNTER — NON-APPOINTMENT (OUTPATIENT)
Age: 61
End: 2021-11-22

## 2021-11-22 ENCOUNTER — APPOINTMENT (OUTPATIENT)
Dept: OPHTHALMOLOGY | Facility: CLINIC | Age: 61
End: 2021-11-22
Payer: MEDICAID

## 2021-11-22 PROCEDURE — 92004 COMPRE OPH EXAM NEW PT 1/>: CPT

## 2021-12-07 ENCOUNTER — APPOINTMENT (OUTPATIENT)
Dept: OPHTHALMOLOGY | Facility: CLINIC | Age: 61
End: 2021-12-07

## 2021-12-16 LAB
HBA1C MFR BLD HPLC: 7.8
LDLC SERPL DIRECT ASSAY-MCNC: 151
MICROALBUMIN/CREAT 24H UR-RTO: 47

## 2021-12-17 ENCOUNTER — RESULT CHARGE (OUTPATIENT)
Age: 61
End: 2021-12-17

## 2021-12-17 ENCOUNTER — APPOINTMENT (OUTPATIENT)
Dept: ENDOCRINOLOGY | Facility: CLINIC | Age: 61
End: 2021-12-17
Payer: MEDICAID

## 2021-12-17 VITALS
DIASTOLIC BLOOD PRESSURE: 70 MMHG | WEIGHT: 163 LBS | SYSTOLIC BLOOD PRESSURE: 122 MMHG | HEART RATE: 73 BPM | HEIGHT: 59 IN | BODY MASS INDEX: 32.86 KG/M2

## 2021-12-17 DIAGNOSIS — E66.9 OBESITY, UNSPECIFIED: ICD-10-CM

## 2021-12-17 DIAGNOSIS — K75.81 NONALCOHOLIC STEATOHEPATITIS (NASH): ICD-10-CM

## 2021-12-17 DIAGNOSIS — E88.81 METABOLIC SYNDROME: ICD-10-CM

## 2021-12-17 DIAGNOSIS — E11.65 TYPE 2 DIABETES MELLITUS WITH HYPERGLYCEMIA: ICD-10-CM

## 2021-12-17 DIAGNOSIS — E78.5 HYPERLIPIDEMIA, UNSPECIFIED: ICD-10-CM

## 2021-12-17 DIAGNOSIS — E55.9 VITAMIN D DEFICIENCY, UNSPECIFIED: ICD-10-CM

## 2021-12-17 DIAGNOSIS — E53.8 DEFICIENCY OF OTHER SPECIFIED B GROUP VITAMINS: ICD-10-CM

## 2021-12-17 LAB — GLUCOSE BLDC GLUCOMTR-MCNC: 76

## 2021-12-17 PROCEDURE — 82962 GLUCOSE BLOOD TEST: CPT

## 2021-12-17 PROCEDURE — 99215 OFFICE O/P EST HI 40 MIN: CPT | Mod: 25

## 2021-12-17 NOTE — HISTORY OF PRESENT ILLNESS
[FreeTextEntry1] : Interval Hx: \par - s/p gastric sleeve 4/14/21, [facility] with post op anemia s/p transfusion and lost 55 pounds and has been following with bariatric surgeon\par - has HUNTER/?liver cirhhosis (bariatric surgeon noticed this at time of surgery)- saw GI doctor, Dr Reina\par - has been adherent with Victoza 1.2 mg daily and would like dose increase\par - using Admelog scale 100-200 0 units, 201-250 40 units, 251-300 45 units, 301-350 50 units, 351-400 55 units, 401+ 60 units; has not needed to take this\par - not following bariatric diet, increased portion size and back to eating carbs, had pizza yesterday; back to eating candy\par - eating to feeling full and about to vomit\par - also needs carpal tunnel surgery and endocrine clearance\par \par Meter reviewed - no meter, variable numbers numbers all over, FS \par \par Quality: Type 2\par Severity: severe, uncontrolled\par Duration: 2003\par Onset: brother with history DM, brother tested FS and it was 200's\par \par ASSOCIATED SYMPTOMS/COMPLICATIONS:\par Eye exam no DR per pt 2019\par (+) Neuropathy\par (+) HUNTER, saw GI\par denies CAD\par \par Medication history: Lantus does not work. Trulicity - self DC due to weight gain. Novolog not covered by insurance. Humulin u500 prior to bariatric surgery\par \par \par \par

## 2021-12-17 NOTE — PHYSICAL EXAM
[Alert] : alert [Well Nourished] : well nourished [Obese] : obese [No Acute Distress] : no acute distress [EOMI] : extra ocular movement intact [No Accessory Muscle Use] : no accessory muscle use [Clear to Auscultation] : lungs were clear to auscultation bilaterally [Normal S1, S2] : normal S1 and S2 [Normal Rate] : heart rate was normal [No Edema] : no peripheral edema [Not Tender] : non-tender [Soft] : abdomen soft [Normal Gait] : normal gait [Oriented x3] : oriented to person, place, and time [Normal Affect] : the affect was normal [Normal Insight/Judgement] : insight and judgment were intact [Normal Mood] : the mood was normal

## 2021-12-17 NOTE — ASSESSMENT
[FreeTextEntry1] : 61 year old female\par 1. 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  Improved A1c!! down to 7.8%\par - she did  not bring logs today, still reports bouts of highs and lows, likely due to nonadherence with diet\par - needs to adhere with bariatric surgery diet!!!\par - avoid over treating lows\par - cont Admelog U-200 201-250 40 units, 251-300 45 units, 301-350 50 units, 351-400 55 units, 401+ 60 units\par - increase Victoza 1.8 mg daily to help w appetite suppression, help with glucose lowering so she can be on lower insulin doses. risks/benefits discussed\par \par 2. Obesity - weight loss s/p gastric sleeve, encouraged adherence with diet, advised that she follow bariatric nutrion recs\par \par 3. HLD - cannot take statin (by history)\par \par 4. HUNTER/fatty \par  - discussed pioglitazine risks/benefits but she declined,  follow up with GI doctor\par \par 5. needs endo clearance for carpal tunnel surgery - A1c 7.8%, acceptable for surgery, pt advised to start recording numbers on logs and drop off logs\par \par 45 min spent w pt - discussion of diet, importance of record keeping, review of lab results, review of med changes, pt informed that Stephen will no longer take [facility] community plan and list of alternate endo given to her\par \par \par \par \par \par \par \par

## 2021-12-17 NOTE — DATA REVIEWED
[FreeTextEntry1] : Labs 10/22/20\par Gluc 249, A1c 11.9\par Cr 0.80, GFR 80\par , HDL 54, Tg 189\par TSH 0.945, T4 7.8, T3 107\par B12 741, vit D 39.2\par \par Labs 11/4/20\par 24 hour urine cortisol 22.9 (normal)\par A1c 12.4\par Cr 0.71\par \par TSH 1.28\par \par Labs 11/16/20 1 mg DST cortisol 1.0 (appropriate)\par \par Labs 1/19/21 A1c 10.5, B12 135\par \par Labs 3/1/21 \par Gluc 137, A1c 8.5\par Cr 0.79, GFR 81\par , Tg 161, HDL 49\par TSH 1.23\par urine alb/Cr 181\par \par Labs 9/13/21\par Cr 0.9\par B12 684\par A1c 7.8\par vit D 45\par , Tg 239\par \par Labs 12/14/21\par Gluc 129\par Cr 0.81, GFR 83\par , HDL 50, Tg 172\par A1c 7.8\par urine alb/Cr 55\par

## 2022-01-04 ENCOUNTER — NON-APPOINTMENT (OUTPATIENT)
Age: 62
End: 2022-01-04

## 2022-01-04 ENCOUNTER — APPOINTMENT (OUTPATIENT)
Dept: OPHTHALMOLOGY | Facility: CLINIC | Age: 62
End: 2022-01-04
Payer: MEDICAID

## 2022-01-04 PROCEDURE — 92014 COMPRE OPH EXAM EST PT 1/>: CPT

## 2022-01-04 PROCEDURE — 92136 OPHTHALMIC BIOMETRY: CPT

## 2022-01-07 ENCOUNTER — NON-APPOINTMENT (OUTPATIENT)
Age: 62
End: 2022-01-07

## 2022-01-19 ENCOUNTER — APPOINTMENT (OUTPATIENT)
Dept: OPHTHALMOLOGY | Facility: HOSPITAL | Age: 62
End: 2022-01-19

## 2022-01-20 ENCOUNTER — APPOINTMENT (OUTPATIENT)
Dept: OPHTHALMOLOGY | Facility: CLINIC | Age: 62
End: 2022-01-20

## 2022-01-25 ENCOUNTER — APPOINTMENT (OUTPATIENT)
Dept: OPHTHALMOLOGY | Facility: CLINIC | Age: 62
End: 2022-01-25

## 2022-01-26 ENCOUNTER — APPOINTMENT (OUTPATIENT)
Dept: OPHTHALMOLOGY | Facility: CLINIC | Age: 62
End: 2022-01-26

## 2022-02-15 ENCOUNTER — RX RENEWAL (OUTPATIENT)
Age: 62
End: 2022-02-15

## 2022-03-17 ENCOUNTER — APPOINTMENT (OUTPATIENT)
Dept: ENDOCRINOLOGY | Facility: CLINIC | Age: 62
End: 2022-03-17

## 2022-03-22 RX ORDER — BLOOD SUGAR DIAGNOSTIC
STRIP MISCELLANEOUS
Qty: 8 | Refills: 0 | Status: ACTIVE | COMMUNITY
Start: 2020-11-13 | End: 1900-01-01

## 2022-03-22 RX ORDER — INSULIN LISPRO 100 U/ML
100 INJECTION, SOLUTION SUBCUTANEOUS
Qty: 11 | Refills: 0 | Status: ACTIVE | COMMUNITY
Start: 1900-01-01 | End: 1900-01-01

## 2022-05-24 ENCOUNTER — RX RENEWAL (OUTPATIENT)
Age: 62
End: 2022-05-24

## 2022-05-24 RX ORDER — LIRAGLUTIDE 6 MG/ML
18 INJECTION SUBCUTANEOUS
Qty: 3 | Refills: 1 | Status: ACTIVE | COMMUNITY
Start: 2021-07-23 | End: 1900-01-01

## 2022-11-10 ENCOUNTER — RX RENEWAL (OUTPATIENT)
Age: 62
End: 2022-11-10

## 2023-01-03 ENCOUNTER — RX RENEWAL (OUTPATIENT)
Age: 63
End: 2023-01-03

## 2025-07-09 ENCOUNTER — APPOINTMENT (OUTPATIENT)
Dept: HEPATOLOGY | Facility: CLINIC | Age: 65
End: 2025-07-09
Payer: MEDICARE

## 2025-07-09 VITALS
DIASTOLIC BLOOD PRESSURE: 84 MMHG | BODY MASS INDEX: 34.68 KG/M2 | HEIGHT: 59 IN | SYSTOLIC BLOOD PRESSURE: 137 MMHG | WEIGHT: 172 LBS | HEART RATE: 77 BPM | RESPIRATION RATE: 12 BRPM

## 2025-07-09 PROCEDURE — 99204 OFFICE O/P NEW MOD 45 MIN: CPT

## 2025-07-09 RX ORDER — INSULIN GLARGINE 100 [IU]/ML
100 INJECTION, SOLUTION SUBCUTANEOUS
Refills: 0 | Status: ACTIVE | COMMUNITY

## 2025-07-09 RX ORDER — INSULIN LISPRO 100 [IU]/ML
INJECTION, SOLUTION INTRAVENOUS; SUBCUTANEOUS
Refills: 0 | Status: ACTIVE | COMMUNITY